# Patient Record
Sex: FEMALE | Race: WHITE | NOT HISPANIC OR LATINO | URBAN - METROPOLITAN AREA
[De-identification: names, ages, dates, MRNs, and addresses within clinical notes are randomized per-mention and may not be internally consistent; named-entity substitution may affect disease eponyms.]

---

## 2019-12-05 ENCOUNTER — MEDICAL CORRESPONDENCE (OUTPATIENT)
Dept: HEALTH INFORMATION MANAGEMENT | Facility: CLINIC | Age: 59
End: 2019-12-05

## 2022-10-05 ENCOUNTER — TRANSFERRED RECORDS (OUTPATIENT)
Dept: HEALTH INFORMATION MANAGEMENT | Facility: CLINIC | Age: 62
End: 2022-10-05

## 2023-01-26 ENCOUNTER — TRANSFERRED RECORDS (OUTPATIENT)
Dept: HEALTH INFORMATION MANAGEMENT | Facility: CLINIC | Age: 63
End: 2023-01-26

## 2023-01-30 ENCOUNTER — TRANSCRIBE ORDERS (OUTPATIENT)
Dept: OTHER | Age: 63
End: 2023-01-30

## 2023-01-30 DIAGNOSIS — L64.9 ANDROGENETIC ALOPECIA: Primary | ICD-10-CM

## 2023-09-07 NOTE — TELEPHONE ENCOUNTER
FUTURE VISIT INFORMATION      FUTURE VISIT INFORMATION:  Date: 12.5.23  Time: 1:30  Location: [facility] [place]  REFERRAL INFORMATION:  Referring provider:  Gianluca  Referring providers clinic:  [facility]  Reason for visit/diagnosis  Androgenetic alopecia [L64.9]/ Referred by: Dr Kalen Hough @ [facility] [place]/ recs in [facility]/ appt sched per pt      RECORDS REQUESTED FROM:       Clinic name Comments Records Status Imaging Status   [facility] 1.26.23  Gianluca Received  In Epic    [facility] 10.5.22  Path # [id number] Received  In Epic

## 2023-12-05 ENCOUNTER — OFFICE VISIT (OUTPATIENT)
Dept: DERMATOLOGY | Facility: CLINIC | Age: 63
End: 2023-12-05
Payer: COMMERCIAL

## 2023-12-05 ENCOUNTER — LAB (OUTPATIENT)
Dept: LAB | Facility: CLINIC | Age: 63
End: 2023-12-05
Payer: COMMERCIAL

## 2023-12-05 ENCOUNTER — PRE VISIT (OUTPATIENT)
Dept: DERMATOLOGY | Facility: CLINIC | Age: 63
End: 2023-12-05

## 2023-12-05 VITALS — SYSTOLIC BLOOD PRESSURE: 114 MMHG | DIASTOLIC BLOOD PRESSURE: 84 MMHG | HEART RATE: 102 BPM

## 2023-12-05 DIAGNOSIS — R79.0 LOW IRON STORES: ICD-10-CM

## 2023-12-05 DIAGNOSIS — L65.9 LOSS OF HAIR: ICD-10-CM

## 2023-12-05 DIAGNOSIS — L64.9 ANDROGENETIC ALOPECIA: ICD-10-CM

## 2023-12-05 DIAGNOSIS — L30.9 DERMATITIS: ICD-10-CM

## 2023-12-05 DIAGNOSIS — L65.9 NON-SCARRING ALOPECIA: Primary | ICD-10-CM

## 2023-12-05 LAB
ALBUMIN SERPL BCG-MCNC: 4.1 G/DL (ref 3.5–5.2)
ALP SERPL-CCNC: 99 U/L (ref 40–150)
ALT SERPL W P-5'-P-CCNC: 15 U/L (ref 0–50)
ANION GAP SERPL CALCULATED.3IONS-SCNC: 9 MMOL/L (ref 7–15)
AST SERPL W P-5'-P-CCNC: 22 U/L (ref 0–45)
BASOPHILS # BLD AUTO: 0.1 10E3/UL (ref 0–0.2)
BASOPHILS NFR BLD AUTO: 1 %
BILIRUB SERPL-MCNC: <0.2 MG/DL
BUN SERPL-MCNC: 11.9 MG/DL (ref 8–23)
CALCIUM SERPL-MCNC: 9.1 MG/DL (ref 8.8–10.2)
CHLORIDE SERPL-SCNC: 104 MMOL/L (ref 98–107)
CREAT SERPL-MCNC: 0.85 MG/DL (ref 0.51–0.95)
DEPRECATED HCO3 PLAS-SCNC: 28 MMOL/L (ref 22–29)
EGFRCR SERPLBLD CKD-EPI 2021: 78 ML/MIN/1.73M2
EOSINOPHIL # BLD AUTO: 0.4 10E3/UL (ref 0–0.7)
EOSINOPHIL NFR BLD AUTO: 5 %
ERYTHROCYTE [DISTWIDTH] IN BLOOD BY AUTOMATED COUNT: 19.3 % (ref 10–15)
FERRITIN SERPL-MCNC: 27 NG/ML (ref 11–328)
GLUCOSE SERPL-MCNC: 79 MG/DL (ref 70–99)
HCT VFR BLD AUTO: 33.6 % (ref 35–47)
HGB BLD-MCNC: 10.7 G/DL (ref 11.7–15.7)
IMM GRANULOCYTES # BLD: 0 10E3/UL
IMM GRANULOCYTES NFR BLD: 0 %
IRON BINDING CAPACITY (ROCHE): 318 UG/DL (ref 240–430)
IRON SATN MFR SERPL: 8 % (ref 15–46)
IRON SERPL-MCNC: 24 UG/DL (ref 37–145)
LYMPHOCYTES # BLD AUTO: 1.7 10E3/UL (ref 0.8–5.3)
LYMPHOCYTES NFR BLD AUTO: 22 %
MCH RBC QN AUTO: 26.8 PG (ref 26.5–33)
MCHC RBC AUTO-ENTMCNC: 31.8 G/DL (ref 31.5–36.5)
MCV RBC AUTO: 84 FL (ref 78–100)
MONOCYTES # BLD AUTO: 0.5 10E3/UL (ref 0–1.3)
MONOCYTES NFR BLD AUTO: 7 %
NEUTROPHILS # BLD AUTO: 5.3 10E3/UL (ref 1.6–8.3)
NEUTROPHILS NFR BLD AUTO: 65 %
NRBC # BLD AUTO: 0 10E3/UL
NRBC BLD AUTO-RTO: 0 /100
PLATELET # BLD AUTO: 451 10E3/UL (ref 150–450)
POTASSIUM SERPL-SCNC: 4.7 MMOL/L (ref 3.4–5.3)
PROT SERPL-MCNC: 6.4 G/DL (ref 6.4–8.3)
RBC # BLD AUTO: 3.99 10E6/UL (ref 3.8–5.2)
SHBG SERPL-SCNC: 58 NMOL/L (ref 30–135)
SODIUM SERPL-SCNC: 141 MMOL/L (ref 135–145)
TSH SERPL DL<=0.005 MIU/L-ACNC: 0.6 UIU/ML (ref 0.3–4.2)
VIT D+METAB SERPL-MCNC: 49 NG/ML (ref 20–50)
WBC # BLD AUTO: 8 10E3/UL (ref 4–11)

## 2023-12-05 PROCEDURE — 83550 IRON BINDING TEST: CPT | Performed by: PATHOLOGY

## 2023-12-05 PROCEDURE — 36415 COLL VENOUS BLD VENIPUNCTURE: CPT | Performed by: PATHOLOGY

## 2023-12-05 PROCEDURE — 84270 ASSAY OF SEX HORMONE GLOBUL: CPT

## 2023-12-05 PROCEDURE — 82627 DEHYDROEPIANDROSTERONE: CPT

## 2023-12-05 PROCEDURE — 84443 ASSAY THYROID STIM HORMONE: CPT | Performed by: PATHOLOGY

## 2023-12-05 PROCEDURE — 99204 OFFICE O/P NEW MOD 45 MIN: CPT | Mod: GC | Performed by: DERMATOLOGY

## 2023-12-05 PROCEDURE — 84630 ASSAY OF ZINC: CPT | Mod: 90 | Performed by: PATHOLOGY

## 2023-12-05 PROCEDURE — 99000 SPECIMEN HANDLING OFFICE-LAB: CPT | Performed by: PATHOLOGY

## 2023-12-05 PROCEDURE — 83540 ASSAY OF IRON: CPT | Performed by: PATHOLOGY

## 2023-12-05 PROCEDURE — 84403 ASSAY OF TOTAL TESTOSTERONE: CPT

## 2023-12-05 PROCEDURE — 82306 VITAMIN D 25 HYDROXY: CPT

## 2023-12-05 PROCEDURE — 85025 COMPLETE CBC W/AUTO DIFF WBC: CPT | Performed by: PATHOLOGY

## 2023-12-05 PROCEDURE — 82728 ASSAY OF FERRITIN: CPT | Performed by: PATHOLOGY

## 2023-12-05 PROCEDURE — 80053 COMPREHEN METABOLIC PANEL: CPT | Performed by: PATHOLOGY

## 2023-12-05 RX ORDER — DICYCLOMINE HCL 20 MG
TABLET ORAL
COMMUNITY

## 2023-12-05 RX ORDER — LANOLIN ALCOHOL/MO/W.PET/CERES
3 CREAM (GRAM) TOPICAL
COMMUNITY
Start: 2023-11-29

## 2023-12-05 RX ORDER — FLUOCINOLONE ACETONIDE 0.11 MG/ML
OIL TOPICAL
Qty: 118 ML | Refills: 11 | Status: SHIPPED | OUTPATIENT
Start: 2023-12-05

## 2023-12-05 RX ORDER — IBUPROFEN 200 MG
TABLET ORAL
COMMUNITY

## 2023-12-05 RX ORDER — GABAPENTIN 800 MG/1
TABLET ORAL
COMMUNITY

## 2023-12-05 RX ORDER — METHOTREXATE 2.5 MG/1
TABLET ORAL
COMMUNITY

## 2023-12-05 RX ORDER — FUROSEMIDE 20 MG
TABLET ORAL
COMMUNITY

## 2023-12-05 RX ORDER — ONDANSETRON 8 MG/1
TABLET, ORALLY DISINTEGRATING ORAL
COMMUNITY

## 2023-12-05 RX ORDER — METHOCARBAMOL 750 MG/1
TABLET, FILM COATED ORAL
COMMUNITY

## 2023-12-05 RX ORDER — CIMETIDINE 400 MG
400 TABLET ORAL AT BEDTIME
COMMUNITY
Start: 2023-11-29

## 2023-12-05 RX ORDER — TESTOSTERONE CYPIONATE 200 MG/ML
INJECTION, SOLUTION INTRAMUSCULAR
COMMUNITY
Start: 2023-01-19

## 2023-12-05 RX ORDER — KETOCONAZOLE 20 MG/ML
SHAMPOO TOPICAL
COMMUNITY

## 2023-12-05 RX ORDER — DIPHENOXYLATE HCL/ATROPINE 2.5-.025MG
TABLET ORAL
COMMUNITY

## 2023-12-05 RX ORDER — ALENDRONATE SODIUM 35 MG/1
TABLET ORAL
COMMUNITY

## 2023-12-05 RX ORDER — DEXTROAMPHETAMINE SACCHARATE, AMPHETAMINE ASPARTATE, DEXTROAMPHETAMINE SULFATE AND AMPHETAMINE SULFATE 2.5; 2.5; 2.5; 2.5 MG/1; MG/1; MG/1; MG/1
10 TABLET ORAL
COMMUNITY
Start: 2023-09-07 | End: 2023-12-07

## 2023-12-05 RX ORDER — PROPRANOLOL HYDROCHLORIDE 40 MG/1
TABLET ORAL
COMMUNITY

## 2023-12-05 RX ORDER — GABAPENTIN 400 MG/1
CAPSULE ORAL
COMMUNITY

## 2023-12-05 RX ORDER — HYDROXYCHLOROQUINE SULFATE 200 MG/1
1 TABLET, FILM COATED ORAL DAILY
COMMUNITY
Start: 2023-03-10

## 2023-12-05 RX ORDER — PROCHLORPERAZINE MALEATE 5 MG
TABLET ORAL
COMMUNITY

## 2023-12-05 RX ORDER — MINOXIDIL 2.5 MG/1
2.5 TABLET ORAL DAILY
COMMUNITY

## 2023-12-05 RX ORDER — ESOMEPRAZOLE MAGNESIUM 40 MG/1
40 CAPSULE, DELAYED RELEASE ORAL DAILY
COMMUNITY

## 2023-12-05 RX ORDER — PROMETHAZINE HYDROCHLORIDE 25 MG/1
TABLET ORAL
COMMUNITY

## 2023-12-05 RX ORDER — BUPRENORPHINE HYDROCHLORIDE AND NALOXONE HYDROCHLORIDE DIHYDRATE 8; 2 MG/1; MG/1
TABLET SUBLINGUAL
COMMUNITY

## 2023-12-05 RX ORDER — SPIRONOLACTONE 100 MG/1
1 TABLET, FILM COATED ORAL DAILY
COMMUNITY
Start: 2023-11-07

## 2023-12-05 RX ORDER — DUTASTERIDE 0.5 MG/1
CAPSULE, LIQUID FILLED ORAL
COMMUNITY

## 2023-12-05 RX ORDER — DEXTROAMPHETAMINE SACCHARATE, AMPHETAMINE ASPARTATE, DEXTROAMPHETAMINE SULFATE AND AMPHETAMINE SULFATE 5; 5; 5; 5 MG/1; MG/1; MG/1; MG/1
TABLET ORAL
COMMUNITY
Start: 2023-08-14

## 2023-12-05 RX ORDER — SUMATRIPTAN 100 MG/1
TABLET, FILM COATED ORAL
COMMUNITY

## 2023-12-05 RX ORDER — PHENOBARBITAL, HYOSCYAMINE SULFATE, ATROPINE SULFATE, SCOPOLAMINE HYDROBROMIDE 16.2; .1037; .0194; .0065 MG/1; MG/1; MG/1; MG/1
1 TABLET ORAL
COMMUNITY
Start: 2023-06-06

## 2023-12-05 RX ORDER — FOLIC ACID 1 MG/1
TABLET ORAL
COMMUNITY

## 2023-12-05 NOTE — LETTER
12/5/2023       RE: Rosy Lake  [address]  [facility] [address]     Dear Colleague,    Thank you for referring your patient, Rosy Lake, to the [facility] [place] at [facility]. Please see a copy of my visit note below.    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 2/2 iron deficiency anemia  # Seborrheic dermatitis  - Prior biopsy 10/5/2022: consistent with androgenetic alopecia  - Prior outside labs 10/23: hemoglobin 9.5, dihydrotestosterone 3.0, testosterone 92 (H)  - Prior outside labs 10/22: iron 83, ferritin 90, TIBC 301, hemoglobin 11.7  - Current tx: oral spironolactone, oral minoxidil, ketokonazole shampoo, rheumatologist prescribed barocitinib (patient hasn't received yet), pending topical spironolactone and dutasteride from outside dermatologist  - patient has topical minoxidil, ketokonazole shampoo but has not yet been using   ____________________________________________    Assessment & Plan:     # Non-scarring alopecia. Likely multifactorial etiology including telogen effluvium 2/2 iron deficiency anemia with erythema and scale of the scalp suggesting seborrheic dermatitis and possible component of androgenetic alopecia.    Patient notes approximately 70% volume loss. No evidence of scarring on exam. Patient with normal part width, and decreased hair density within the postauricular scalp.     - Hair metrix performed today   - Labs ordered: CBC, CMP, DHEA sulfate, testosterone free and total, iron panel, ferritin, Vitamin D  - Start ketokonazole shampoo twice weekly  - Start fluocinolone oil nightly the night before shampooing  - Supplement with iron for 3 months until next appointment given anemia (will repeat labs at that time)    Procedures Performed:   None    Follow-up: 3  months    Staff and Resident:     Ijeoma Chavez MD (PGY-3)  Dermatology Resident     Patient was seen and examined with the dermatology resident. I agree with the history, review of systems, physical examination, assessments and plan.    Barbara Mancilla MD  Professor and  Chair  Department of Derm[facility]   ___________________________________________    CC: Hair Loss (Androgenic alopecia/Patient notes that she will be starting Olumiant 2 mg and Dutasteride with spironolactone topical the week of 12/4/23)    HPI:  Ms. Rosy Lake is a 63 year old female who presents as a new patient for evaluation of hair loss.   - Seen at the request of: Dr. Kalen Hough @ [facility] [place]    - Reason for referral: Androgenetic Alopecia  - Onset of hair loss: 6 years ago had minor hair loss, one year 2 months ago, hair started falling out severely, no infections or other stressors that started around that time  - no eyelash hair falling out, no eyebrow hair falling out, no other body hair falling out   - no female hair loss in family history   - Patient feels like she has extreme malnutrition   - no itching or burning of the scalp  - Has been using the Rogaine foam intermittently  - has gotten LLLT, has not used that yet    - has been using oral minoxidil and dutasteride (one year and two months)  - has ketoconazole shampoo, did not use due to being in a deep depression  - Tried spironolactone (few months)  - Was prescribed Olumiant (will receive in the mail Thursday)  - Topical spironolactone and topical dutasteride (will get RX this week)  - takes Nutrofol intermittently  - takes biotin, horse tail, magnesium, zinc  - Percentage of hair loss: about 70% of hair loss    Labs:    Outside CBC, iron panel, CMP reviewed    Physical Exam:  Vitals: /84   Pulse 102     GEN: Well developed, well-nourished, in no acute distress, in a pleasant mood.    SKIN: Scalp exam was performed.  - Tomas  part width of 1   - Some thinning of the scalp hair density along the postauricular scalp  - Diffuse scale noted on Hair Metrics  - No folliculitis or diffuse erythema  - Negative hair pull tests  - Eyebrow and eyelash hair normal  - Hair pull test negative  - Normal hair density along other parts of scalp  - No erythema appreciated on scalp      Medications:  Current Outpatient Medications   Medication     alendronate (FOSAMAX) 35 MG tablet     amphetamine-dextroamphetamine (ADDERALL) 10 MG tablet     amphetamine-dextroamphetamine (ADDERALL) 20 MG tablet     buprenorphine-naloxone (SUBOXONE) 8-2 MG SUBL sublingual tablet     cimetidine (TAGAMET) 400 MG tablet     diclofenac (VOLTAREN) 1 % topical gel     dicyclomine (BENTYL) 20 MG tablet     diphenoxylate-atropine (LOMOTIL) 2.5-0.025 MG tablet     dutasteride (AVODART) 0.5 MG capsule     eflornithine HCl 13.9 % CREA     esomeprazole (NEXIUM) 40 MG DR capsule     folic acid (FOLVITE) 1 MG tablet     furosemide (LASIX) 20 MG tablet     gabapentin (NEURONTIN) 400 MG capsule     gabapentin (NEURONTIN) 800 MG tablet     hydroxychloroquine (PLAQUENIL) 200 MG tablet     ibuprofen (ADVIL/MOTRIN) 200 MG tablet     ketoconazole (NIZORAL) 2 % external shampoo     melatonin 3 MG tablet     methocarbamol (ROBAXIN) 750 MG tablet     methotrexate 2.5 MG tablet     minoxidil (LONITEN) 2.5 MG tablet     Minoxidil 5 % FOAM     ondansetron (ZOFRAN ODT) 8 MG ODT tab     PB-Hyoscy-Atropine-Scopolamine (PHENOBARBITAL-BELLADONNA ALK) 16.2 MG TABS     prochlorperazine (COMPAZINE) 5 MG tablet     promethazine (PHENERGAN) 25 MG tablet     propranolol (INDERAL) 40 MG tablet     spironolactone (ALDACTONE) 100 MG tablet     SUMAtriptan (IMITREX) 100 MG tablet     testosterone cypionate (DEPOTESTOSTERONE) 200 MG/ML injection     No current facility-administered medications for this visit.      Past Medical History:   There is no problem list on file for this patient.    No past medical history  on file.    CC No referring provider defined for this encounter. on close of this encounter.      Again, thank you for allowing me to participate in the care of your patient.      Sincerely,    Barbara Mancilla MD

## 2023-12-05 NOTE — NURSING NOTE
Dermatology Rooming Note    Rosy Lake's goals for this visit include:   Chief Complaint   Patient presents with    Hair Loss     Androgenic alopecia       Denita Churchill LPN

## 2023-12-05 NOTE — PROGRESS NOTES
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 2/2 iron deficiency anemia  # Seborrheic dermatitis  - Prior biopsy 10/5/2022: consistent with androgenetic alopecia  - Prior outside labs 10/23: hemoglobin 9.5, dihydrotestosterone 3.0, testosterone 92 (H)  - Prior outside labs 10/22: iron 83, ferritin 90, TIBC 301, hemoglobin 11.7  - Current tx: oral spironolactone, oral minoxidil, ketokonazole shampoo, rheumatologist prescribed barocitinib (patient hasn't received yet), pending topical spironolactone and dutasteride from outside dermatologist  - patient has topical minoxidil, ketokonazole shampoo but has not yet been using   ____________________________________________    Assessment & Plan:     # Non-scarring alopecia. Likely multifactorial etiology including telogen effluvium 2/2 iron deficiency anemia with erythema and scale of the scalp suggesting seborrheic dermatitis and possible component of androgenetic alopecia.    Patient notes approximately 70% volume loss. No evidence of scarring on exam. Patient with normal part width, and decreased hair density within the postauricular scalp.     - Hair metrix performed today   - Labs ordered: CBC, CMP, DHEA sulfate, testosterone free and total, iron panel, ferritin, Vitamin D  - Start ketokonazole shampoo twice weekly  - Start fluocinolone oil nightly the night before shampooing  - Supplement with iron for 3 months until next appointment given anemia (will repeat labs at that time)    Procedures Performed:   None    Follow-up: 3 months    Staff and Resident:     Ijeoma Chavez MD (PGY-3)  Dermatology Resident     Patient was seen and examined with the dermatology resident. I agree with the history, review of systems, physical examination, assessments and plan.    Barbara Mancilla MD  Professor and  Chair  Department of  Dermatology  [facility]   ___________________________________________    CC: Hair Loss (Androgenic alopecia/Patient notes that she will be starting Olumiant 2 mg and Dutasteride with spironolactone topical the week of 12/4/23)    HPI:  Ms. Rosy Lake is a 63 year old female who presents as a new patient for evaluation of hair loss.   - Seen at the request of: Dr. Kalen Hough @ [facility] [place]    - Reason for referral: Androgenetic Alopecia  - Onset of hair loss: 6 years ago had minor hair loss, one year 2 months ago, hair started falling out severely, no infections or other stressors that started around that time  - no eyelash hair falling out, no eyebrow hair falling out, no other body hair falling out   - no female hair loss in family history   - Patient feels like she has extreme malnutrition   - no itching or burning of the scalp  - Has been using the Rogaine foam intermittently  - has gotten LLLT, has not used that yet    - has been using oral minoxidil and dutasteride (one year and two months)  - has ketoconazole shampoo, did not use due to being in a deep depression  - Tried spironolactone (few months)  - Was prescribed Olumiant (will receive in the mail Thursday)  - Topical spironolactone and topical dutasteride (will get RX this week)  - takes Nutrofol intermittently  - takes biotin, horse tail, magnesium, zinc  - Percentage of hair loss: about 70% of hair loss    Labs:    Outside CBC, iron panel, CMP reviewed    Physical Exam:  Vitals: /84   Pulse 102     GEN: Well developed, well-nourished, in no acute distress, in a pleasant mood.    SKIN: Scalp exam was performed.  - Tomas part width of 1   - Some thinning of the scalp hair density along the postauricular scalp  - Diffuse scale noted on Hair Metrics  - No folliculitis or diffuse erythema  - Negative hair pull tests  - Eyebrow and eyelash hair normal  - Hair pull test negative  - Normal hair density along other parts  of scalp  - No erythema appreciated on scalp      Medications:  Current Outpatient Medications   Medication    alendronate (FOSAMAX) 35 MG tablet    amphetamine-dextroamphetamine (ADDERALL) 10 MG tablet    amphetamine-dextroamphetamine (ADDERALL) 20 MG tablet    buprenorphine-naloxone (SUBOXONE) 8-2 MG SUBL sublingual tablet    cimetidine (TAGAMET) 400 MG tablet    diclofenac (VOLTAREN) 1 % topical gel    dicyclomine (BENTYL) 20 MG tablet    diphenoxylate-atropine (LOMOTIL) 2.5-0.025 MG tablet    dutasteride (AVODART) 0.5 MG capsule    eflornithine HCl 13.9 % CREA    esomeprazole (NEXIUM) 40 MG DR capsule    folic acid (FOLVITE) 1 MG tablet    furosemide (LASIX) 20 MG tablet    gabapentin (NEURONTIN) 400 MG capsule    gabapentin (NEURONTIN) 800 MG tablet    hydroxychloroquine (PLAQUENIL) 200 MG tablet    ibuprofen (ADVIL/MOTRIN) 200 MG tablet    ketoconazole (NIZORAL) 2 % external shampoo    melatonin 3 MG tablet    methocarbamol (ROBAXIN) 750 MG tablet    methotrexate 2.5 MG tablet    minoxidil (LONITEN) 2.5 MG tablet    Minoxidil 5 % FOAM    ondansetron (ZOFRAN ODT) 8 MG ODT tab    PB-Hyoscy-Atropine-Scopolamine (PHENOBARBITAL-BELLADONNA ALK) 16.2 MG TABS    prochlorperazine (COMPAZINE) 5 MG tablet    promethazine (PHENERGAN) 25 MG tablet    propranolol (INDERAL) 40 MG tablet    spironolactone (ALDACTONE) 100 MG tablet    SUMAtriptan (IMITREX) 100 MG tablet    testosterone cypionate (DEPOTESTOSTERONE) 200 MG/ML injection     No current facility-administered medications for this visit.      Past Medical History:   There is no problem list on file for this patient.    No past medical history on file.    CC No referring provider defined for this encounter. on close of this encounter.

## 2023-12-06 LAB — DHEA-S SERPL-MCNC: <15 UG/DL (ref 35–430)

## 2023-12-07 LAB — ZINC SERPL-MCNC: 80.6 UG/DL

## 2023-12-08 LAB
TESTOST FREE SERPL-MCNC: 0.06 NG/DL
TESTOST SERPL-MCNC: 5 NG/DL (ref 8–60)

## 2023-12-19 NOTE — PROGRESS NOTES
[employer] Summary (12/5/2023):    Frontal    Superior    Vertex    Right temporal    Left temporal

## 2023-12-19 NOTE — PROGRESS NOTES
HairMetrix Summary (12/5/2023)    Frontal anterior (5.5 cm)    Mid scalp (12 cm)    Vertex (24 cm)    Occipital (30 cm)    Right temporal (8, 10 cm)    Left temporal (8, 10 cm)    Summary

## 2024-02-25 ENCOUNTER — HEALTH MAINTENANCE LETTER (OUTPATIENT)
Age: 64
End: 2024-02-25

## 2024-04-24 ENCOUNTER — TELEPHONE (OUTPATIENT)
Dept: DERMATOLOGY | Facility: CLINIC | Age: 64
End: 2024-04-24
Payer: COMMERCIAL

## 2024-04-24 NOTE — TELEPHONE ENCOUNTER
Called the patient and told her that we had to move up appointment time do to it being a holiday and the clinic closes at 12. Told the patient to call [phone] if this time doesn't work.         Thais TANG

## 2024-07-30 ENCOUNTER — TELEPHONE (OUTPATIENT)
Dept: DERMATOLOGY | Facility: CLINIC | Age: 64
End: 2024-07-30
Payer: COMMERCIAL

## 2024-07-30 NOTE — TELEPHONE ENCOUNTER
[facility] Call Center    Phone Message    May a detailed message be left on voicemail: yes     Reason for Call: Medication Question or concern regarding medication   Prescription Clarification  Name of Medication: Fluocinolone Acetonide Scalp (DERMA-SMOOTHE/FS SCALP) 0.01 % OIL oil [140112] (Order [id number])  Prescribing Provider: Dr. Mancilla   Pharmacy: [facility]   What on the order needs clarification? Prior Auth fax had been sent to clinic. Also cover my meds key # [id number]      Action Taken: Other: TE to [facility]    Travel Screening: Not Applicable     Date of Service:

## 2024-07-30 NOTE — TELEPHONE ENCOUNTER
Prior Authorization Retail Medication Request    Medication/Dose: Fluocinolone Acetonide Scalp (DERMA-SMOOTHE/FS SCALP) 0.01 % OIL oil  Diagnosis and ICD code (if different than what is on RX):  Non-scarring alopecia [L65.9]  - Primary   cover my meds key # [id number]     Previously Tried and Failed:  See provider's notes    Insurance   Primary: [facility]  Insurance ID:  [id number]    Pharmacy Information (if different than what is on RX)  Name:  [id number] - [place] - [address]   Phone:  [phone]   Fax:[phone]

## 2024-07-31 NOTE — TELEPHONE ENCOUNTER
Retail Pharmacy Prior Authorization Team   Phone: [phone]    PA Initiation    Medication: FLUOCINOLONE ACETONIDE SCALP 0.01 % EX OIL  Insurance Company: [employer] - Phone [phone] Fax [phone]  Pharmacy Filling the Rx: [id number] - [place], [place] - [address]  Filling Pharmacy Phone: [phone]  Filling Pharmacy Fax:    Start Date: 7/31/2024      Note: Due to record-high volumes, our turn-around time is taking longer than usual . We are currently 6 days behind in the pools.   We are working diligently to submit all requests in a timely manner and in the order they are received. Please only flag TRUE URGENT requests as high priority to the pool at this time.   If you have questions on status of PA's,  please send a note/message in the active PA encounter and send back to the [facility] PA pool [[id number]].    If you have questions about the turn-around time or about our process, please reach out to our supervisor Abby Huynh.   Thank you!   RPPA (Retail Pharmacy Prior Authorization) team

## 2024-08-06 NOTE — TELEPHONE ENCOUNTER
Retail Pharmacy Prior Authorization Team   Phone: [phone]    Prior Authorization Approval    Medication: FLUOCINOLONE ACETONIDE SCALP 0.01 % EX OIL  Authorization Effective Date: 8/1/2024  Authorization Expiration Date: 12/31/2024  Reference #:  [id number]  Insurance Company: [employer] - Phone [phone] Fax [phone]  Which Pharmacy is filling the prescription: [id number] - [place], [place] - [address]  Pharmacy Notified: YES   Patient Notified: YES **Instructed pharmacy to notify patient when script is ready to /ship.**

## 2025-01-07 ENCOUNTER — MEDICAL CORRESPONDENCE (OUTPATIENT)
Dept: HEALTH INFORMATION MANAGEMENT | Facility: CLINIC | Age: 65
End: 2025-01-07
Payer: COMMERCIAL

## 2025-01-08 DIAGNOSIS — L65.9 NON-SCARRING ALOPECIA: ICD-10-CM

## 2025-01-13 RX ORDER — FLUOCINOLONE ACETONIDE 0.11 MG/ML
OIL TOPICAL
Qty: 118.28 ML | Refills: 2 | Status: SHIPPED | OUTPATIENT
Start: 2025-01-13

## 2025-01-13 NOTE — TELEPHONE ENCOUNTER
Medication Requested:  Fluocinolone Acetonide Scalp (DERMA-SMOOTHE/FS SCALP) 0.01 % OIL oil       Last Written Prescription Date:  12/5/23  Last Fill Quantity: 118ml,   # refills: 11  ----------------------  Last Office Visit : 12/5/23 Charo MAHARAJ  Future Office visit:  8/26/25  ----------------------        Refill decision: Refill pended and routed to the provider for review/determination due to the following criteria not met: Overdue office visit          Jaylyn B, RN  P Central Nursing/Red Flag Triage & Med Refill Team

## 2025-01-20 ENCOUNTER — TRANSCRIBE ORDERS (OUTPATIENT)
Dept: OTHER | Age: 65
End: 2025-01-20

## 2025-01-20 DIAGNOSIS — E34.9 TESTOSTERONE DEFICIENCY: ICD-10-CM

## 2025-01-20 DIAGNOSIS — L64.9 ANDROGENIC ALOPECIA: Primary | ICD-10-CM

## 2025-03-15 ENCOUNTER — HEALTH MAINTENANCE LETTER (OUTPATIENT)
Age: 65
End: 2025-03-15